# Patient Record
Sex: FEMALE | Race: WHITE | Employment: FULL TIME
[De-identification: names, ages, dates, MRNs, and addresses within clinical notes are randomized per-mention and may not be internally consistent; named-entity substitution may affect disease eponyms.]

---

## 2022-06-09 ENCOUNTER — OFFICE VISIT (OUTPATIENT)
Dept: URGENT CARE | Facility: CLINIC | Age: 32
End: 2022-06-09
Payer: COMMERCIAL

## 2022-06-09 VITALS
DIASTOLIC BLOOD PRESSURE: 60 MMHG | TEMPERATURE: 98 F | OXYGEN SATURATION: 98 % | RESPIRATION RATE: 12 BRPM | SYSTOLIC BLOOD PRESSURE: 100 MMHG | BODY MASS INDEX: 19.32 KG/M2 | HEART RATE: 96 BPM | WEIGHT: 105 LBS | HEIGHT: 62 IN

## 2022-06-09 DIAGNOSIS — F41.9 ANXIETY: Primary | ICD-10-CM

## 2022-06-09 PROCEDURE — 99202 OFFICE O/P NEW SF 15 MIN: CPT | Performed by: PHYSICIAN ASSISTANT

## 2022-06-09 RX ORDER — SERTRALINE HYDROCHLORIDE 25 MG/1
TABLET, FILM COATED ORAL
Qty: 90 TABLET | Refills: 1 | Status: SHIPPED | OUTPATIENT
Start: 2022-06-09

## 2022-06-09 ASSESSMENT — ENCOUNTER SYMPTOMS
COUGH: 0
VOMITING: 0
SHORTNESS OF BREATH: 0
NAUSEA: 0
DIARRHEA: 0
SORE THROAT: 0
ABDOMINAL PAIN: 0

## 2022-06-09 NOTE — PROGRESS NOTES
Azul Trammell (: 1990) is a 36 y.o. female is here for evaluation of the following chief complaint(s):  Chief Complaint   Patient presents with    Anxiety        ASSESSMENT/PLAN:  Below is the assessment and plan developed based on review of pertinent history, physical exam, labs, studies, and medications. Moriah Eddy was seen today for anxiety. Diagnoses and all orders for this visit:    Anxiety  -     sertraline (ZOLOFT) 25 MG tablet; Take 25 mg daily for one week then increase to 50 mg daily. Take in the morning with food. Because patient is waiting to establish with a pcp and has been seeing a therapist who recommends sertraline, I will go ahead and start her on it. Patient to start Sertraline as prescribed. Take 25 mg daily for one week then increase to 50 mg daily. We discussed potential side effects and appropriate precautions given. Strict ER precautions given. Patient to follow up with PCP as discussed. Patient to return to clinic if symptoms persist or worsen. We discussed reasons to present to the ER or call 911, including but not limited to headache, blurred vision, speech disturbance, difficulty with ambulation/gait, numbness, tingling, weakness, chest pain, shortness of breath, syncope. Patient verbalizes understanding and agreement. SUBJECTIVE/OBJECTIVE:  Patient is a 35 y/o female who presents today to discuss her anxiety. She is waiting to establish with a primary care but has been seeing a therapist who suggested she start zoloft. Patient says she has dealt with anxiety and depression for as long as she can remember. She works out regularly and eats healthy. She has been seeing a therapist which helps but she doesn't feel it is enough. She has difficulty sleeping due to anxiety and feels anxious in social situations. She has not ever tried anything for anxiety and has been hesitant to start but has decided it is time to start. She is requesting Zoloft.  She denies suicidal or homicidal ideation. Patient denies fever, chills, chest pain, shortness of breath, nausea, vomiting, diarrhea, abdominal or back pain, lightheadedness, dizziness, weakness. No Known Allergies  History reviewed. No pertinent past medical history. History reviewed. No pertinent surgical history. Family History   Problem Relation Age of Onset    No Known Problems Mother     No Known Problems Father     No Known Problems Sister     No Known Problems Brother     No Known Problems Maternal Aunt     No Known Problems Maternal Uncle     No Known Problems Paternal Aunt     No Known Problems Paternal Uncle     No Known Problems Maternal Grandmother     No Known Problems Maternal Grandfather     No Known Problems Paternal Grandmother     No Known Problems Paternal Grandfather     No Known Problems Maternal Cousin     No Known Problems Paternal Cousin     No Known Problems Other         Social Connections:     Frequency of Communication with Friends and Family: Not on file    Frequency of Social Gatherings with Friends and Family: Not on file    Attends [employer] Services: Not on file    Active Member of Clubs or Organizations: Not on file    Attends Club or Organization Meetings: Not on file    Marital Status: Not on file          Review of Systems   Constitutional: Negative for chills and fever. HENT: Negative for sore throat. Respiratory: Negative for cough and shortness of breath. Cardiovascular: Negative for chest pain, palpitations and leg swelling. Gastrointestinal: Negative for abdominal pain, diarrhea, nausea and vomiting. Skin: Negative for rash. Neurological: Negative for dizziness, light-headedness and headaches.        /60 (Site: Right Upper Arm, Position: Sitting, Cuff Size: Large Adult)   Pulse 96   Temp 98 °F (36.7 °C) (Temporal)   Resp 12   Ht 5' 2\" (1.575 m)   Wt 105 lb (47.6 kg)   LMP 03/23/2022 (Exact Date)   SpO2 98%   Breastfeeding No   BMI 19.20 kg/m²      Physical Exam  Vitals and nursing note reviewed. Exam conducted with a chaperone present. Constitutional:       Appearance: Normal appearance. HENT:      Head: Normocephalic and atraumatic. Eyes:      Conjunctiva/sclera: Conjunctivae normal.   Cardiovascular:      Rate and Rhythm: Normal rate and regular rhythm. Pulses: Normal pulses. Heart sounds: Normal heart sounds. Pulmonary:      Effort: Pulmonary effort is normal.      Breath sounds: Normal breath sounds. No wheezing, rhonchi or rales. Abdominal:      Tenderness: There is no guarding. Musculoskeletal:      Cervical back: Normal range of motion. Skin:     General: Skin is warm and dry. Neurological:      Mental Status: She is alert. Psychiatric:         Mood and Affect: Mood normal.         Behavior: Behavior normal.           An electronic signature was used to authenticate this note.   -- AGUSTÍN Stokes

## 2022-09-12 DIAGNOSIS — F41.9 ANXIETY: ICD-10-CM

## 2023-04-27 ENCOUNTER — OFFICE VISIT (OUTPATIENT)
Dept: ORTHOPEDIC SURGERY | Age: 33
End: 2023-04-27
Payer: COMMERCIAL

## 2023-04-27 DIAGNOSIS — S76.011A STRAIN OF RIGHT HIP ADDUCTOR MUSCLE, INITIAL ENCOUNTER: ICD-10-CM

## 2023-04-27 DIAGNOSIS — M25.551 RIGHT HIP PAIN: Primary | ICD-10-CM

## 2023-04-27 PROCEDURE — 99203 OFFICE O/P NEW LOW 30 MIN: CPT | Performed by: STUDENT IN AN ORGANIZED HEALTH CARE EDUCATION/TRAINING PROGRAM

## 2023-04-27 RX ORDER — MELOXICAM 7.5 MG/1
7.5 TABLET ORAL DAILY PRN
Qty: 30 TABLET | Refills: 0 | Status: SHIPPED | OUTPATIENT
Start: 2023-04-27

## 2023-04-27 NOTE — PROGRESS NOTES
Name: Sebastián Soriano  YOB: 1990  Gender: female  MRN: [id number]  Date of Encounter:  4/27/2023       CHIEF COMPLAINT:     Chief Complaint   Patient presents with    Hip Pain     Right        SUBJECTIVE/OBJECTIVE:      HPI:    Patient is a 29 y.o. pleasant female who presents today for a new evaluation of her right hip pain. She has had 7 weeks of right hip pain that is localized more posterior medial, and the proximal abductor hamstring area. This started while she was on a run where she felt acute pain, but no palpable pop or tear. She had problems running back during that run. She was unable to continue running due to pain and took some time off from running, but continued to be active, like biking. She also did some informal PT with the \[facility]\"" in [place] office as she works as an occupational therapist here. She has seen improvement with her time of rest, but she recently tried to run again and her pain flared back up. She does take NSAIDs which give her relief. She denies prior hip injury. PAST HISTORY:   Past medical, surgical, family, social history and allergies reviewed by me. Pertinent history:   Tobacco use:  has no history on file for tobacco use. Diabetes: none  Anticoagulation: no      REVIEW OF SYSTEMS:   As noted in HPI. PHYSICAL EXAMINATION:     Gen: Well-developed, no acute distress   HEENT: NC/AT, EOMI   Neck: Trachea midline, normal ROM   CV: Regular rhythm by palpation of distal pulse, normal capillary refill   Pulm: No respiratory distress, no stridor   Psychiatric: Well oriented, normal mood and affect. Skin: No rashes, lesions or ulcers, normal temperature, turgor, and texture on uninvolved extremity. ORTHO EXAM:    Right Hip:      Inspection: No deformity, No edema, No ecchymosis  ROM: 100 flexion, 30 internal rotation, 45 external rotation, 15 extension  Tenderness: No tenderness of ASIS, AIIS. Mild hip flexor tenderness.   No greater trochanter

## 2023-05-03 ENCOUNTER — TELEPHONE (OUTPATIENT)
Dept: ORTHOPEDIC SURGERY | Age: 33
End: 2023-05-03

## 2023-05-03 DIAGNOSIS — M84.350D STRESS FRACTURE OF PELVIS WITH ROUTINE HEALING, SUBSEQUENT ENCOUNTER: Primary | ICD-10-CM

## 2023-05-03 NOTE — TELEPHONE ENCOUNTER
FOLLOW UP NOTE PHONE CALL FOR MRI RESULTS:       MRI shows healing fracture of inferior pubic rami, with additional areas of bony edema of the right sacral ala, right and left superior pubic root. Discussion:   She was cycling daily, maybe 10 miles in a session prior to the injury. She was also running 20 miles a week. She does not have regular periods. She went 5 years without a period. She started having a cycle again in January. She is seeing OBGYN and had an appointment recently and had labs done. She had a prior tibial stress fracture in high school. She does not recall a stress injury in college. She was underweight for awhile. At her lowest, maybe 90lbs. She has struggled with disordered eating. She recently started working on this. She has not been weighing herself, but thinks shes at 120 currently. She has seen a dietition and therapist in the past but not recent. Assessment:   Stress fracture of pelvis     Plan:     I will refer her to nutrition and a sports therapist, which she is really interested in. She will continue work up with her GYN, but I do suspect her low hormone levels are likely 2/2 RED-S  Obtain Vit D level. May consider DXA scan. No high impact activity at this time. She should refrain from running, jumping, plyometrics. She can bike in moderation if not having pain, and swim. Follow up in 4 weeks for recheck. She is agreeable with care plan.      Brook Porter MD

## 2023-06-01 ENCOUNTER — OFFICE VISIT (OUTPATIENT)
Dept: ORTHOPEDIC SURGERY | Age: 33
End: 2023-06-01

## 2023-06-01 DIAGNOSIS — M84.350D STRESS FRACTURE OF PELVIS WITH ROUTINE HEALING, SUBSEQUENT ENCOUNTER: Primary | ICD-10-CM

## 2023-06-01 NOTE — PROGRESS NOTES
Name: Mina Sandy  YOB: 1990  Gender: female  MRN: [id number]  Date of Encounter:  6/1/2023       CHIEF COMPLAINT:     Chief Complaint   Patient presents with    Follow-up     Right hip        SUBJECTIVE/OBJECTIVE:      HPI:    Patient is a 29 y.o. pleasant female who presents today for a return evaluation of her pelvis. Working diagnosis:   1. Stress fracture of pelvis with routine healing, subsequent encounter       LOV: 4/27/2023 4/27/23 she presented for around 7 weeks of right hip pain medially. She is an active runner and stationary cyclist. We obtained an MRI of the pelvis due to abnormality on pelvic XR, which confirmed healing fracture and 2 other areas of bony edema suggestive of stress fracture. After further discussion, she has had some issues with disordered eating, one prior stress injury, and menstrual cycle abnormality. We advised she rest from activity for the next month. We referred her to nutrition and counseling. 6/1/23:   She is doing well. She denies any hip or pelvic pain. She has not been running. She has been cycling 3 times a week, doing some band work, and swimming. She has seen nutrition once. She did not get a call from counseling yet. She has not had any reoccurrence of menstrual cycle yet. PAST HISTORY:   Past medical, surgical, family, social history and allergies reviewed by me. Unchanged from prior visit. REVIEW OF SYSTEMS:   As noted in HPI. PHYSICAL EXAMINATION:     Gen: Well-developed, no acute distress   HEENT: NC/AT, EOMI   Neck: Trachea midline, normal ROM   CV: Regular rhythm by palpation of distal pulse, normal capillary refill   Pulm: No respiratory distress, no stridor   Psychiatric: Well oriented, normal mood and affect. Skin: No rashes, lesions or ulcers, normal temperature, turgor, and texture on uninvolved extremity. ORTHO EXAM:    R hip:   Flexion 100, internal rotation 30, external rotation 45.    Negative FADIR,

## 2024-09-15 RX ORDER — SERTRALINE HYDROCHLORIDE 25 MG/1
TABLET, FILM COATED ORAL
Qty: 90 TABLET | Refills: 1 | OUTPATIENT
Start: 2024-09-15

## 2025-02-14 ENCOUNTER — HOSPITAL ENCOUNTER (EMERGENCY)
Age: 35
Discharge: HOME | End: 2025-02-14
Attending: GENERAL PRACTICE
Payer: COMMERCIAL

## 2025-02-14 ENCOUNTER — APPOINTMENT (OUTPATIENT)
Dept: CT IMAGING | Age: 35
End: 2025-02-14
Payer: COMMERCIAL

## 2025-02-14 VITALS
BODY MASS INDEX: 22.2 KG/M2 | WEIGHT: 130 LBS | SYSTOLIC BLOOD PRESSURE: 137 MMHG | RESPIRATION RATE: 16 BRPM | DIASTOLIC BLOOD PRESSURE: 92 MMHG | HEART RATE: 96 BPM | TEMPERATURE: 98.3 F | OXYGEN SATURATION: 96 % | HEIGHT: 64 IN

## 2025-02-14 DIAGNOSIS — R10.9 FLANK PAIN: Primary | ICD-10-CM

## 2025-02-14 DIAGNOSIS — D72.829 LEUKOCYTOSIS, UNSPECIFIED TYPE: ICD-10-CM

## 2025-02-14 LAB
ALBUMIN SERPL-MCNC: 4 G/DL (ref 3.5–5)
ALBUMIN/GLOB SERPL: 1.4 (ref 1–1.9)
ALP SERPL-CCNC: 49 U/L (ref 35–104)
ALT SERPL-CCNC: 16 U/L (ref 8–45)
ANION GAP SERPL CALC-SCNC: 12 MMOL/L (ref 7–16)
APPEARANCE UR: CLEAR
AST SERPL-CCNC: 32 U/L (ref 15–37)
BACTERIA URNS QL MICRO: NEGATIVE /HPF
BASOPHILS # BLD: 0.06 K/UL (ref 0–0.2)
BASOPHILS NFR BLD: 0.4 % (ref 0–2)
BILIRUB SERPL-MCNC: 0.4 MG/DL (ref 0–1.2)
BILIRUB UR QL: NEGATIVE
BUN SERPL-MCNC: 23 MG/DL (ref 6–23)
CALCIUM SERPL-MCNC: 9.7 MG/DL (ref 8.8–10.2)
CHLORIDE SERPL-SCNC: 99 MMOL/L (ref 98–107)
CO2 SERPL-SCNC: 27 MMOL/L (ref 20–29)
COLOR UR: ABNORMAL
CREAT SERPL-MCNC: 1.36 MG/DL (ref 0.6–1.1)
DIFFERENTIAL METHOD BLD: ABNORMAL
EOSINOPHIL # BLD: 0.14 K/UL (ref 0–0.8)
EOSINOPHIL NFR BLD: 1 % (ref 0.5–7.8)
EPI CELLS #/AREA URNS HPF: ABNORMAL /HPF
ERYTHROCYTE [DISTWIDTH] IN BLOOD BY AUTOMATED COUNT: 12.1 % (ref 11.9–14.6)
GLOBULIN SER CALC-MCNC: 2.9 G/DL (ref 2.3–3.5)
GLUCOSE SERPL-MCNC: 135 MG/DL (ref 70–99)
GLUCOSE UR STRIP.AUTO-MCNC: NEGATIVE MG/DL
HCG UR QL: NEGATIVE
HCT VFR BLD AUTO: 38.9 % (ref 35.8–46.3)
HGB BLD-MCNC: 13 G/DL (ref 11.7–15.4)
HGB UR QL STRIP: NEGATIVE
HYALINE CASTS URNS QL MICRO: ABNORMAL /LPF
IMM GRANULOCYTES # BLD AUTO: 0.06 K/UL (ref 0–0.5)
IMM GRANULOCYTES NFR BLD AUTO: 0.4 % (ref 0–5)
KETONES UR QL STRIP.AUTO: NEGATIVE MG/DL
LEUKOCYTE ESTERASE UR QL STRIP.AUTO: NEGATIVE
LIPASE SERPL-CCNC: 33 U/L (ref 13–60)
LYMPHOCYTES # BLD: 2 K/UL (ref 0.5–4.6)
LYMPHOCYTES NFR BLD: 14.1 % (ref 13–44)
MCH RBC QN AUTO: 29.3 PG (ref 26.1–32.9)
MCHC RBC AUTO-ENTMCNC: 33.4 G/DL (ref 31.4–35)
MCV RBC AUTO: 87.6 FL (ref 82–102)
MONOCYTES # BLD: 1.09 K/UL (ref 0.1–1.3)
MONOCYTES NFR BLD: 7.7 % (ref 4–12)
NEUTS SEG # BLD: 10.84 K/UL (ref 1.7–8.2)
NEUTS SEG NFR BLD: 76.4 % (ref 43–78)
NITRITE UR QL STRIP.AUTO: NEGATIVE
NRBC # BLD: 0 K/UL (ref 0–0.2)
PH UR STRIP: 6 (ref 5–9)
PLATELET # BLD AUTO: 247 K/UL (ref 150–450)
PMV BLD AUTO: 9.1 FL (ref 9.4–12.3)
POTASSIUM SERPL-SCNC: 3.9 MMOL/L (ref 3.5–5.1)
PROT SERPL-MCNC: 6.9 G/DL (ref 6.3–8.2)
PROT UR STRIP-MCNC: 30 MG/DL
RBC # BLD AUTO: 4.44 M/UL (ref 4.05–5.2)
RBC #/AREA URNS HPF: ABNORMAL /HPF
SODIUM SERPL-SCNC: 138 MMOL/L (ref 136–145)
SP GR UR REFRACTOMETRY: 1.01 (ref 1–1.02)
UROBILINOGEN UR QL STRIP.AUTO: 0.2 EU/DL (ref 0.2–1)
WBC # BLD AUTO: 14.2 K/UL (ref 4.3–11.1)
WBC URNS QL MICRO: ABNORMAL /HPF

## 2025-02-14 PROCEDURE — 81001 URINALYSIS AUTO W/SCOPE: CPT

## 2025-02-14 PROCEDURE — 80053 COMPREHEN METABOLIC PANEL: CPT

## 2025-02-14 PROCEDURE — 83690 ASSAY OF LIPASE: CPT

## 2025-02-14 PROCEDURE — 74176 CT ABD & PELVIS W/O CONTRAST: CPT

## 2025-02-14 PROCEDURE — 81025 URINE PREGNANCY TEST: CPT

## 2025-02-14 PROCEDURE — 85025 COMPLETE CBC W/AUTO DIFF WBC: CPT

## 2025-02-14 PROCEDURE — 99284 EMERGENCY DEPT VISIT MOD MDM: CPT

## 2025-02-14 ASSESSMENT — PAIN - FUNCTIONAL ASSESSMENT: PAIN_FUNCTIONAL_ASSESSMENT: 0-10

## 2025-02-14 ASSESSMENT — PAIN SCALES - GENERAL: PAINLEVEL_OUTOF10: 3

## 2025-02-14 NOTE — ED PROVIDER NOTES
Emergency Department Provider Note       PCP: None, None   Age: 34 y.o.   Sex: female     DISPOSITION Decision To Discharge 02/14/2025 06:46:25 PM   DISPOSITION CONDITION Stable            ICD-10-CM    1. Flank pain  R10.9       2. Leukocytosis, unspecified type  D72.829           Medical Decision Making     Patient presents with flank pain.  Considered pyelonephritis, obstructive uropathy, nephrolithiasis, bowel obstruction, abscess, among other intra-abdominal emergencies.  Patient's workup here was negative.  No evidence of a kidney stone.  She does have constipation but no bowel obstruction.  No evidence of pyelonephritis.  She remained stable here and relatively asymptomatic with a normal exam.  I do not feel she needs further workup here.  Possibly she could have passed a kidney stone and this was discussed.  Nevertheless, she is given strict return precautions and patient is to follow-up with primary care.     1 acute complicated illness or injury.  Over the counter drug management performed.  Shared medical decision making was utilized in creating the patients health plan today.    I independently ordered and reviewed each unique test.  I reviewed external records: provider visit note from outside specialist.  I reviewed external records: previous lab results from outside ED.  I reviewed external records: previous imaging study including radiologist interpretation.     I interpreted the CT Scan CT scan renal stone protocol shows no evidence of kidney stone or hydronephrosis or bowel obstruction.  There is some constipation.  I have reviewed and agree with radiology.              History     Patient presents with left flank pain.  Symptoms were severe and woke her up about 4 in the morning.  Patient was pacing around the room and had vomiting.  She denies any obvious hematuria or dysuria.  Symptoms largely resolved but she was left with some dull pain in the flank.  She went to the primary and had a

## 2025-02-14 NOTE — ED TRIAGE NOTES
Pt c/o sudden onset severe left flank pain last night last about 30 min, took ibuprofen ,today still having dull pain in flank , gain and don leg, pt saw pcp today ,had negative urine

## 2025-06-04 DIAGNOSIS — J01.90 ACUTE SINUSITIS, RECURRENCE NOT SPECIFIED, UNSPECIFIED LOCATION: Primary | ICD-10-CM
